# Patient Record
Sex: MALE | Race: WHITE | NOT HISPANIC OR LATINO
[De-identification: names, ages, dates, MRNs, and addresses within clinical notes are randomized per-mention and may not be internally consistent; named-entity substitution may affect disease eponyms.]

---

## 2023-09-01 ENCOUNTER — NON-APPOINTMENT (OUTPATIENT)
Age: 63
End: 2023-09-01

## 2023-10-12 PROBLEM — Z00.00 ENCOUNTER FOR PREVENTIVE HEALTH EXAMINATION: Status: ACTIVE | Noted: 2023-10-12

## 2023-10-13 ENCOUNTER — APPOINTMENT (OUTPATIENT)
Dept: PULMONOLOGY | Facility: CLINIC | Age: 63
End: 2023-10-13
Payer: COMMERCIAL

## 2023-10-13 ENCOUNTER — NON-APPOINTMENT (OUTPATIENT)
Age: 63
End: 2023-10-13

## 2023-10-13 VITALS
BODY MASS INDEX: 30.35 KG/M2 | HEART RATE: 62 BPM | HEIGHT: 73 IN | SYSTOLIC BLOOD PRESSURE: 122 MMHG | WEIGHT: 229 LBS | OXYGEN SATURATION: 96 % | TEMPERATURE: 97.3 F | DIASTOLIC BLOOD PRESSURE: 80 MMHG

## 2023-10-13 DIAGNOSIS — Z78.9 OTHER SPECIFIED HEALTH STATUS: ICD-10-CM

## 2023-10-13 DIAGNOSIS — U07.1 COVID-19: ICD-10-CM

## 2023-10-13 DIAGNOSIS — Z87.81 PERSONAL HISTORY OF (HEALED) TRAUMATIC FRACTURE: ICD-10-CM

## 2023-10-13 DIAGNOSIS — Z82.49 FAMILY HISTORY OF ISCHEMIC HEART DISEASE AND OTHER DISEASES OF THE CIRCULATORY SYSTEM: ICD-10-CM

## 2023-10-13 PROCEDURE — 99204 OFFICE O/P NEW MOD 45 MIN: CPT | Mod: 25

## 2023-10-13 PROCEDURE — 94729 DIFFUSING CAPACITY: CPT

## 2023-10-13 PROCEDURE — 94727 GAS DIL/WSHOT DETER LNG VOL: CPT

## 2023-10-13 PROCEDURE — 94010 BREATHING CAPACITY TEST: CPT

## 2023-10-13 RX ORDER — BACILLUS COAGULANS/INULIN 1B-250 MG
CAPSULE ORAL
Refills: 0 | Status: ACTIVE | COMMUNITY

## 2023-10-13 RX ORDER — ASCORBIC ACID 500 MG
TABLET ORAL
Refills: 0 | Status: ACTIVE | COMMUNITY

## 2023-10-13 RX ORDER — B-COMPLEX WITH VITAMIN C
TABLET ORAL
Refills: 0 | Status: ACTIVE | COMMUNITY

## 2023-10-13 RX ORDER — BECLOMETHASONE DIPROPIONATE HFA 80 UG/1
80 AEROSOL, METERED RESPIRATORY (INHALATION)
Qty: 1 | Refills: 3 | Status: ACTIVE | COMMUNITY
Start: 2023-10-13 | End: 1900-01-01

## 2023-10-13 RX ORDER — MAGNESIUM OXIDE/MAG AA CHELATE 300 MG
CAPSULE ORAL
Refills: 0 | Status: ACTIVE | COMMUNITY

## 2023-11-08 ENCOUNTER — APPOINTMENT (OUTPATIENT)
Dept: PULMONOLOGY | Facility: CLINIC | Age: 63
End: 2023-11-08
Payer: COMMERCIAL

## 2023-11-08 VITALS
OXYGEN SATURATION: 98 % | HEART RATE: 68 BPM | BODY MASS INDEX: 29.82 KG/M2 | WEIGHT: 225 LBS | TEMPERATURE: 97.2 F | HEIGHT: 73 IN | SYSTOLIC BLOOD PRESSURE: 118 MMHG | DIASTOLIC BLOOD PRESSURE: 70 MMHG

## 2023-11-08 DIAGNOSIS — N28.1 CYST OF KIDNEY, ACQUIRED: ICD-10-CM

## 2023-11-08 DIAGNOSIS — R91.1 SOLITARY PULMONARY NODULE: ICD-10-CM

## 2023-11-08 DIAGNOSIS — Z87.39 PERSONAL HISTORY OF OTHER DISEASES OF THE MUSCULOSKELETAL SYSTEM AND CONNECTIVE TISSUE: ICD-10-CM

## 2023-11-08 DIAGNOSIS — R05.3 CHRONIC COUGH: ICD-10-CM

## 2023-11-08 PROCEDURE — 99215 OFFICE O/P EST HI 40 MIN: CPT

## 2024-03-01 NOTE — HISTORY OF PRESENT ILLNESS
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  Patient is a non-smoker.  He does not complain of shortness of breath and participates in basketball games .  Patient described his consult on October 13 of a cough and a tickle and then losing his voice.  Does not have any known toxic inhalations or exposures to present irritants to the respiratory system.  The patient was sent for a CAT scan of the chest.  Patient had a pulmonary function test that was in the low normal range.

## 2024-03-01 NOTE — REASON FOR VISIT
[TextBox_44] : Patient states he is still experiencing a persistent irritating dry cough.  He feels the need to keep on clearing his throat.  Patient had a CT scan.

## 2024-03-01 NOTE — ASSESSMENT
[FreeTextEntry1] : 10/13/2023 Mr. Avendano is a 63-year-old male with no prior pulmonary disease.  Patient has had a chronic cough for 3 months without any known prodrome.  Patient has had no viral infections temporally related to the cough.  Patient has not had any significant inhalation accident in his work to account for his cough.  Patient had denies any GERD symptoms symptoms or postnasal drip as 2 major causes of chronic cough.  Patient has no sinus problems.  Patient has no change in his voice or difficulty swallowing  The patient is a never smoker.  The patient's pulmonary function test revealed normal spirometry lower limits of normal lung volumes as well as DLCO.  At this time there is no specific pathway for diagnostic work-up.  At this time I will start him on inhaled steroid as a general inflammatory agent for his upper airway as well as his lungs.  Since the cough has gone on for 2 to 3 months the patient will have a high-resolution CAT scan to rule out any intrapulmonary process.  Patient was told the proper technique to use the prescribed Greg or inhaler.  The patient will start on Qvar 82 puffs twice a day rinse and gargle.  Patient will report any change in his status and will return in approximately 3 weeks.  Time spent 45 minutes counseling, education, documentation, imaging reviewed, medication reviewed, inhaler demonstrated, old records reviewed, HX and PE   11/8/2023 1) patient had a CAT scan of the chest and had a finding of a 5 mm subpleural nodule in the lower lung field on the right.  The patient is low risk for carcinoma of the lung patient will have a follow-up CAT scan 1 year.  2) there was a renal cyst found on the right 3) the left adrenal adenoma.  CAT scan images were reviewed online with the patient.  4)  there is no primary pulmonary disease described.  Plan at this time is the patient is going to have a ultrasound of the right kidney cyst to see if it is simple cyst versus a complex cyst.  This was all explained to the patient all questions were answered patient was told to call if there is any further questioning.  Time spent 45 minutes counseling, education, documentation, imaging reviewed, medication reviewed, inhaler demonstrated, old records reviewed, HX and PE

## 2024-05-08 ENCOUNTER — APPOINTMENT (OUTPATIENT)
Dept: PULMONOLOGY | Facility: CLINIC | Age: 64
End: 2024-05-08